# Patient Record
Sex: FEMALE | Race: WHITE | NOT HISPANIC OR LATINO | Employment: FULL TIME | URBAN - METROPOLITAN AREA
[De-identification: names, ages, dates, MRNs, and addresses within clinical notes are randomized per-mention and may not be internally consistent; named-entity substitution may affect disease eponyms.]

---

## 2019-03-20 ENCOUNTER — OFFICE VISIT (OUTPATIENT)
Dept: FAMILY MEDICINE CLINIC | Facility: CLINIC | Age: 44
End: 2019-03-20

## 2019-03-20 VITALS
WEIGHT: 179.2 LBS | BODY MASS INDEX: 28.8 KG/M2 | HEIGHT: 66 IN | HEART RATE: 82 BPM | DIASTOLIC BLOOD PRESSURE: 68 MMHG | TEMPERATURE: 98.2 F | OXYGEN SATURATION: 99 % | RESPIRATION RATE: 16 BRPM | SYSTOLIC BLOOD PRESSURE: 118 MMHG

## 2019-03-20 DIAGNOSIS — Z13.220 SCREENING FOR HYPERLIPIDEMIA: ICD-10-CM

## 2019-03-20 DIAGNOSIS — Z13.0 SCREENING FOR DEFICIENCY ANEMIA: ICD-10-CM

## 2019-03-20 DIAGNOSIS — R23.2 HOT FLASHES: Primary | ICD-10-CM

## 2019-03-20 DIAGNOSIS — Z13.1 SCREENING FOR DIABETES MELLITUS: ICD-10-CM

## 2019-03-20 PROCEDURE — 99204 OFFICE O/P NEW MOD 45 MIN: CPT | Performed by: NURSE PRACTITIONER

## 2019-03-20 RX ORDER — ACETAMINOPHEN 500 MG
500 TABLET ORAL EVERY 6 HOURS PRN
COMMUNITY

## 2019-03-20 RX ORDER — IBUPROFEN 200 MG
200 TABLET ORAL EVERY 6 HOURS PRN
COMMUNITY

## 2019-03-20 NOTE — PROGRESS NOTES
Subjective   Salma Thornton is a 43 y.o. female.     Chief Complaint   Patient presents with   • Hot Flashes     [facility] care      Ms. Thornton presents today to establish care at this practice. She states she has not had a PCP in years. She states he thinks she is going through menopause. She report having hot flashes at night. Although she had a hysterectomy she still has her ovaries. She would like to see if she is in menopause. She has not had any labs since her last child was born 4 years ago.    I have reviewed the patient's medical history in detail and updated the computerized patient record.     The following portions of the patient's history were reviewed and updated as appropriate: allergies, current medications, past family history, past medical history, past social history, past surgical history and problem list.      Current Outpatient Medications:   •  acetaminophen (TYLENOL) 500 MG tablet, Take 500 mg by mouth Every 6 (Six) Hours As Needed for Mild Pain ., Disp: , Rfl:   •  ibuprofen (ADVIL,MOTRIN) 200 MG tablet, Take 200 mg by mouth Every 6 (Six) Hours As Needed for Mild Pain ., Disp: , Rfl:      Review of Systems   Constitutional: Negative.    HENT: Negative.    Eyes: Negative.    Respiratory: Negative.    Cardiovascular: Negative.    Gastrointestinal: Negative.    Endocrine: Negative.    Genitourinary:        Hot flashes   Musculoskeletal: Negative.    Skin: Negative.    Allergic/Immunologic: Negative.    Neurological: Negative.    Psychiatric/Behavioral: Negative.        Objective    Vitals:    03/20/19 1043   BP: 118/68   Pulse: 82   Resp: 16   Temp: 98.2 °F (36.8 °C)   SpO2: 99%     Physical Exam   Constitutional: She is oriented to person, place, and time. She appears well-developed and well-nourished.   HENT:   Head: Normocephalic.   Right Ear: External ear normal.   Left Ear: External ear normal.   Mouth/Throat: Oropharynx is clear and moist.   Neck: Normal range of motion. Neck supple. No JVD  present. No tracheal deviation present. No thyromegaly present.   Cardiovascular: Normal rate, regular rhythm, normal heart sounds and intact distal pulses.   Pulmonary/Chest: Effort normal and breath sounds normal.   Abdominal: Soft. Bowel sounds are normal.   Musculoskeletal: Normal range of motion. She exhibits no edema.   Lymphadenopathy:     She has no cervical adenopathy.   Neurological: She is alert and oriented to person, place, and time.   Skin: Skin is warm and dry.   Psychiatric:   No acute distress   Vitals reviewed.        Assessment/Plan   Salma was seen today for hot flashes.    Diagnoses and all orders for this visit:    Hot flashes  -     Follicle stimulating hormone  -     Luteinizing hormone  -     Estradiol    Screening for hyperlipidemia  -     Lipid Panel With / Chol / HDL Ratio    Screening for diabetes mellitus  -     Comprehensive Metabolic Panel    Screening for deficiency anemia  -     CBC auto differential    Other orders  -     CBC & Differential    1. There are no current medical records available to obtain since she has not had a PCP in years.  2. I have ordered the above noted labs to be done today since she is fasting.  3. She is to follow up in 6 months for an annual physical exam.

## 2019-03-21 LAB
ALBUMIN SERPL-MCNC: 4.7 G/DL (ref 3.5–5.5)
ALBUMIN/GLOB SERPL: 1.7 {RATIO} (ref 1.2–2.2)
ALP SERPL-CCNC: 41 IU/L (ref 39–117)
ALT SERPL-CCNC: 15 IU/L (ref 0–32)
AST SERPL-CCNC: 17 IU/L (ref 0–40)
BASOPHILS # BLD AUTO: 0.1 X10E3/UL (ref 0–0.2)
BASOPHILS NFR BLD AUTO: 1 %
BILIRUB SERPL-MCNC: 0.4 MG/DL (ref 0–1.2)
BUN SERPL-MCNC: 14 MG/DL (ref 6–24)
BUN/CREAT SERPL: 22 (ref 9–23)
CALCIUM SERPL-MCNC: 9.4 MG/DL (ref 8.7–10.2)
CHLORIDE SERPL-SCNC: 106 MMOL/L (ref 96–106)
CHOLEST SERPL-MCNC: 200 MG/DL (ref 100–199)
CHOLEST/HDLC SERPL: 4.3 RATIO (ref 0–4.4)
CO2 SERPL-SCNC: 22 MMOL/L (ref 20–29)
CREAT SERPL-MCNC: 0.65 MG/DL (ref 0.57–1)
EOSINOPHIL # BLD AUTO: 0.3 X10E3/UL (ref 0–0.4)
EOSINOPHIL NFR BLD AUTO: 4 %
ERYTHROCYTE [DISTWIDTH] IN BLOOD BY AUTOMATED COUNT: 13.5 % (ref 12.3–15.4)
ESTRADIOL SERPL-MCNC: 95.2 PG/ML
FSH SERPL-ACNC: 4.2 MIU/ML
GLOBULIN SER CALC-MCNC: 2.8 G/DL (ref 1.5–4.5)
GLUCOSE SERPL-MCNC: 82 MG/DL (ref 65–99)
HCT VFR BLD AUTO: 43.7 % (ref 34–46.6)
HDLC SERPL-MCNC: 47 MG/DL
HGB BLD-MCNC: 14.8 G/DL (ref 11.1–15.9)
IMM GRANULOCYTES # BLD AUTO: 0 X10E3/UL (ref 0–0.1)
IMM GRANULOCYTES NFR BLD AUTO: 0 %
LDLC SERPL CALC-MCNC: 136 MG/DL (ref 0–99)
LH SERPL-ACNC: 7.6 MIU/ML
LYMPHOCYTES # BLD AUTO: 2.1 X10E3/UL (ref 0.7–3.1)
LYMPHOCYTES NFR BLD AUTO: 26 %
MCH RBC QN AUTO: 32.5 PG (ref 26.6–33)
MCHC RBC AUTO-ENTMCNC: 33.9 G/DL (ref 31.5–35.7)
MCV RBC AUTO: 96 FL (ref 79–97)
MONOCYTES # BLD AUTO: 0.8 X10E3/UL (ref 0.1–0.9)
MONOCYTES NFR BLD AUTO: 11 %
NEUTROPHILS # BLD AUTO: 4.8 X10E3/UL (ref 1.4–7)
NEUTROPHILS NFR BLD AUTO: 58 %
PLATELET # BLD AUTO: 276 X10E3/UL (ref 150–379)
POTASSIUM SERPL-SCNC: 4.6 MMOL/L (ref 3.5–5.2)
PROT SERPL-MCNC: 7.5 G/DL (ref 6–8.5)
RBC # BLD AUTO: 4.56 X10E6/UL (ref 3.77–5.28)
SODIUM SERPL-SCNC: 142 MMOL/L (ref 134–144)
TRIGL SERPL-MCNC: 87 MG/DL (ref 0–149)
VLDLC SERPL CALC-MCNC: 17 MG/DL (ref 5–40)
WBC # BLD AUTO: 8 X10E3/UL (ref 3.4–10.8)

## 2019-03-21 NOTE — PROGRESS NOTES
Please call the patient regarding her abnormal result. Her labs are good. LDL and total cholesterol are elevated. Needs to cut down on fast foods, processed foods and increase her exercise. No signs of menopause yet.